# Patient Record
Sex: FEMALE | Race: WHITE | NOT HISPANIC OR LATINO | Employment: OTHER
[De-identification: names, ages, dates, MRNs, and addresses within clinical notes are randomized per-mention and may not be internally consistent; named-entity substitution may affect disease eponyms.]

---

## 2017-08-25 ENCOUNTER — HOSPITAL (OUTPATIENT)
Dept: OTHER | Age: 52
End: 2017-08-25

## 2019-10-17 ENCOUNTER — HOSPITAL (OUTPATIENT)
Dept: OTHER | Age: 54
End: 2019-10-17
Attending: OBSTETRICS & GYNECOLOGY

## 2019-12-07 ENCOUNTER — HOSPITAL (OUTPATIENT)
Dept: OTHER | Age: 54
End: 2019-12-07
Attending: INTERNAL MEDICINE

## 2019-12-11 ENCOUNTER — HOSPITAL (OUTPATIENT)
Dept: OTHER | Age: 54
End: 2019-12-11
Attending: INTERNAL MEDICINE

## 2019-12-25 ENCOUNTER — HOSPITAL ENCOUNTER (OUTPATIENT)
Age: 54
Discharge: HOME | End: 2019-12-25
Attending: EMERGENCY MEDICINE
Payer: COMMERCIAL

## 2019-12-25 VITALS
DIASTOLIC BLOOD PRESSURE: 79 MMHG | RESPIRATION RATE: 18 BRPM | WEIGHT: 132 LBS | TEMPERATURE: 98 F | OXYGEN SATURATION: 98 % | SYSTOLIC BLOOD PRESSURE: 144 MMHG | HEART RATE: 75 BPM | BODY MASS INDEX: 18.9 KG/M2 | HEIGHT: 70 IN

## 2019-12-25 DIAGNOSIS — J01.00 ACUTE NON-RECURRENT MAXILLARY SINUSITIS: Primary | ICD-10-CM

## 2019-12-25 PROCEDURE — 99204 OFFICE O/P NEW MOD 45 MIN: CPT

## 2019-12-25 PROCEDURE — 99203 OFFICE O/P NEW LOW 30 MIN: CPT

## 2019-12-25 RX ORDER — AMOXICILLIN 875 MG/1
875 TABLET, COATED ORAL 2 TIMES DAILY
Qty: 20 TABLET | Refills: 0 | Status: SHIPPED | OUTPATIENT
Start: 2019-12-25 | End: 2020-01-04

## 2019-12-25 RX ORDER — FLUCONAZOLE 150 MG/1
150 TABLET ORAL ONCE
Qty: 1 TABLET | Refills: 0 | Status: SHIPPED | OUTPATIENT
Start: 2019-12-25 | End: 2019-12-25

## 2019-12-25 NOTE — ED PROVIDER NOTES
Patient Seen in: [facility] In [address] [place] [address]      History   Patient presents with:  Cough/URI    Stated Complaint: sinus infeciton    HPI  Patient is here with 2 weeks of pain over the maxillary sinuses, pressure in the upper teeth, nasa tenderness present. No frontal sinus tenderness. Left Sinus: Maxillary sinus tenderness present. No frontal sinus tenderness.       Mouth/Throat:      Mouth: Mucous membranes are moist.      Pharynx: No oropharyngeal exudate or posterior oropharyngeal

## 2020-06-26 ENCOUNTER — HOSPITAL ENCOUNTER (OUTPATIENT)
Dept: ULTRASOUND IMAGING | Age: 55
Discharge: HOME | End: 2020-06-26
Attending: INTERNAL MEDICINE

## 2020-06-26 DIAGNOSIS — R92.8 ABNORMAL MAMMOGRAM OF LEFT BREAST: ICD-10-CM

## 2020-06-26 PROCEDURE — 76642 ULTRASOUND BREAST LIMITED: CPT

## 2020-10-02 ENCOUNTER — HOSPITAL ENCOUNTER (OUTPATIENT)
Dept: ULTRASOUND IMAGING | Age: 55
Discharge: HOME | End: 2020-10-02
Attending: INTERNAL MEDICINE

## 2020-10-02 ENCOUNTER — HOSPITAL ENCOUNTER (OUTPATIENT)
Dept: MAMMOGRAPHY | Age: 55
Discharge: HOME | End: 2020-10-02
Attending: INTERNAL MEDICINE

## 2020-10-02 DIAGNOSIS — R92.8 ABNORMAL MAMMOGRAM OF LEFT BREAST: ICD-10-CM

## 2020-10-02 DIAGNOSIS — Z09 FOLLOW UP: ICD-10-CM

## 2020-10-02 PROCEDURE — 76642 ULTRASOUND BREAST LIMITED: CPT

## 2020-10-02 PROCEDURE — G0279 TOMOSYNTHESIS, MAMMO: HCPCS

## 2021-10-08 ENCOUNTER — HOSPITAL ENCOUNTER (OUTPATIENT)
Dept: ULTRASOUND IMAGING | Age: 56
Discharge: HOME | End: 2021-10-08
Attending: INTERNAL MEDICINE

## 2021-10-08 ENCOUNTER — HOSPITAL ENCOUNTER (OUTPATIENT)
Dept: MAMMOGRAPHY | Age: 56
Discharge: HOME | End: 2021-10-08
Attending: INTERNAL MEDICINE

## 2021-10-08 DIAGNOSIS — R92.2 DENSE BREASTS: ICD-10-CM

## 2021-10-08 DIAGNOSIS — R92.30 DENSE BREASTS: ICD-10-CM

## 2021-10-08 DIAGNOSIS — Z12.9 SCREENING FOR MALIGNANT NEOPLASM: ICD-10-CM

## 2021-10-08 PROCEDURE — 76641 ULTRASOUND BREAST COMPLETE: CPT

## 2021-10-08 PROCEDURE — 77063 BREAST TOMOSYNTHESIS BI: CPT

## 2022-05-28 ENCOUNTER — HOSPITAL ENCOUNTER (OUTPATIENT)
Age: 57
Discharge: HOME | End: 2022-05-28
Payer: COMMERCIAL

## 2022-05-28 VITALS
HEIGHT: 70 IN | HEART RATE: 86 BPM | OXYGEN SATURATION: 99 % | TEMPERATURE: 99 F | RESPIRATION RATE: 18 BRPM | SYSTOLIC BLOOD PRESSURE: 137 MMHG | BODY MASS INDEX: 19.33 KG/M2 | DIASTOLIC BLOOD PRESSURE: 94 MMHG | WEIGHT: 135 LBS

## 2022-05-28 DIAGNOSIS — Z20.822 ENCOUNTER FOR LABORATORY TESTING FOR COVID-19 VIRUS: ICD-10-CM

## 2022-05-28 DIAGNOSIS — J01.90 ACUTE NON-RECURRENT SINUSITIS, UNSPECIFIED LOCATION: Primary | ICD-10-CM

## 2022-05-28 LAB — SARS-COV-2 RNA RESP QL NAA+PROBE: NOT DETECTED

## 2022-05-28 RX ORDER — AMOXICILLIN AND CLAVULANATE POTASSIUM 875; 125 MG/1; MG/1
1 TABLET, FILM COATED ORAL 2 TIMES DAILY
Qty: 14 TABLET | Refills: 0 | Status: SHIPPED | OUTPATIENT
Start: 2022-05-28 | End: 2022-06-04

## 2022-05-28 RX ORDER — VALACYCLOVIR HYDROCHLORIDE 1 G/1
TABLET, FILM COATED ORAL
COMMUNITY

## 2022-05-28 RX ORDER — MICONAZOLE NITRATE 200 MG/1
200 SUPPOSITORY VAGINAL NIGHTLY
Qty: 6 SUPPOSITORY | Refills: 0 | Status: SHIPPED | OUTPATIENT
Start: 2022-05-28 | End: 2022-06-03

## 2022-05-28 RX ORDER — FLUCONAZOLE 150 MG/1
150 TABLET ORAL
Qty: 2 TABLET | Refills: 0 | Status: SHIPPED | OUTPATIENT
Start: 2022-05-28 | End: 2022-06-01

## 2022-05-28 NOTE — ED INITIAL ASSESSMENT (HPI)
Pt presents to the IC with c/o sinus congestion and pressure for the last 2 weeks. Pt hasn't been tested for covid during that time. No cough, fevers, ear pain or sore throat.

## 2022-10-14 ENCOUNTER — HOSPITAL ENCOUNTER (OUTPATIENT)
Dept: ULTRASOUND IMAGING | Age: 57
Discharge: HOME | End: 2022-10-14
Attending: INTERNAL MEDICINE

## 2022-10-14 ENCOUNTER — HOSPITAL ENCOUNTER (OUTPATIENT)
Dept: MAMMOGRAPHY | Age: 57
Discharge: HOME | End: 2022-10-14
Attending: INTERNAL MEDICINE

## 2022-10-14 DIAGNOSIS — R92.2 DENSE BREAST: ICD-10-CM

## 2022-10-14 DIAGNOSIS — Z12.39 SCREENING FOR MALIGNANT NEOPLASM OF BREAST: ICD-10-CM

## 2022-10-14 DIAGNOSIS — R92.30 DENSE BREAST: ICD-10-CM

## 2022-10-14 PROCEDURE — 77067 SCR MAMMO BI INCL CAD: CPT

## 2022-10-14 PROCEDURE — 76641 ULTRASOUND BREAST COMPLETE: CPT

## 2023-08-28 DIAGNOSIS — Z12.31 ENCOUNTER FOR SCREENING MAMMOGRAM FOR MALIGNANT NEOPLASM OF BREAST: ICD-10-CM

## 2023-08-28 DIAGNOSIS — R92.2 DENSE BREASTS: Primary | ICD-10-CM

## 2023-08-28 DIAGNOSIS — R92.30 DENSE BREASTS: Primary | ICD-10-CM

## 2023-10-19 ENCOUNTER — HOSPITAL ENCOUNTER (OUTPATIENT)
Dept: CT IMAGING | Age: 58
Discharge: HOME | End: 2023-10-19
Attending: INTERNAL MEDICINE

## 2023-10-19 DIAGNOSIS — R92.30 DENSE BREASTS: ICD-10-CM

## 2023-10-19 DIAGNOSIS — R92.2 DENSE BREASTS: ICD-10-CM

## 2023-10-19 DIAGNOSIS — Z12.31 ENCOUNTER FOR SCREENING MAMMOGRAM FOR MALIGNANT NEOPLASM OF BREAST: ICD-10-CM

## 2023-10-19 PROCEDURE — 77063 BREAST TOMOSYNTHESIS BI: CPT

## 2023-10-19 PROCEDURE — 76641 ULTRASOUND BREAST COMPLETE: CPT

## 2024-02-24 ENCOUNTER — HOSPITAL ENCOUNTER (OUTPATIENT)
Age: 59
Discharge: HOME | End: 2024-02-24
Payer: COMMERCIAL

## 2024-02-24 VITALS
HEART RATE: 86 BPM | OXYGEN SATURATION: 100 % | SYSTOLIC BLOOD PRESSURE: 129 MMHG | DIASTOLIC BLOOD PRESSURE: 74 MMHG | TEMPERATURE: 98 F | RESPIRATION RATE: 18 BRPM

## 2024-02-24 DIAGNOSIS — R52 BODY ACHES: Primary | ICD-10-CM

## 2024-02-24 DIAGNOSIS — J01.11 ACUTE RECURRENT FRONTAL SINUSITIS: ICD-10-CM

## 2024-02-24 LAB — SARS-COV-2 RNA RESP QL NAA+PROBE: NOT DETECTED

## 2024-02-24 PROCEDURE — 99213 OFFICE O/P EST LOW 20 MIN: CPT | Performed by: NURSE PRACTITIONER

## 2024-02-24 PROCEDURE — U0002 COVID-19 LAB TEST NON-CDC: HCPCS | Performed by: NURSE PRACTITIONER

## 2024-02-24 RX ORDER — FLUCONAZOLE 150 MG/1
150 TABLET ORAL ONCE
Qty: 1 TABLET | Refills: 0 | Status: SHIPPED | OUTPATIENT
Start: 2024-02-24 | End: 2024-02-24

## 2024-02-24 RX ORDER — AMOXICILLIN AND CLAVULANATE POTASSIUM 875; 125 MG/1; MG/1
1 TABLET, FILM COATED ORAL 2 TIMES DAILY
Qty: 14 TABLET | Refills: 0 | Status: SHIPPED | OUTPATIENT
Start: 2024-02-24 | End: 2024-03-02

## 2024-02-24 NOTE — DISCHARGE INSTRUCTIONS
COVID test is negative.  Start Flonase.  Take Augmentin twice a day until gone.  Saline nasal spray.  Warm steam to your face.  Tylenol or Motrin as needed for pain.  Follow-up with your primary doctor if no improvement    [email or web address] --> find a doctor

## 2024-02-24 NOTE — ED INITIAL ASSESSMENT (HPI)
Pt reports nasal congestion, sinus pressure, headaches x 7-8 days. Denies fevers. Concerned for sinus infection.  Pt declines covid/flu testing.

## 2024-02-24 NOTE — ED PROVIDER NOTES
Patient Seen in: [facility]      History     Chief Complaint   Patient presents with    Nasal Congestion     Stated Complaint: Congestion, body aches    Subjective:   58-year-old female with no past medical history presents from home with concern for recurrent sinus infection.  Onset of symptoms about 7 days ago.  Symptoms mostly on the left side.  Complaining of left side facial pressure and headache.  Body aches, congestion, ringing in her ears.  States she takes antibiotics about once a year for sinus symptoms.  No fever at home.  She has been using a sinus rinse.  No COVID testing done at home.  States her  was recently seen here and diagnosed with pneumonia.  She is not taking any antihistamines at home.  Non-smoker.    The history is provided by the patient. No  was used.           Objective:   History reviewed. No pertinent past medical history.           History reviewed. No pertinent surgical history.             Social History     Socioeconomic History    Marital status:    Tobacco Use    Smoking status: Never    Smokeless tobacco: Never   Vaping Use    Vaping Use: Never used              Review of Systems    Positive for stated complaint: Congestion, body aches  Other systems are as noted in HPI.  Constitutional and vital signs reviewed.      All other systems reviewed and negative except as noted above.    Physical Exam     ED Triage Vitals [02/24/24 1458]   /74   Pulse 86   Resp 18   Temp 98 °F (36.7 °C)   Temp src Temporal   SpO2 100 %   O2 Device None (Room air)       Current:/74   Pulse 86   Temp 98 °F (36.7 °C) (Temporal)   Resp 18   SpO2 100%         Physical Exam  Vitals and nursing note reviewed.   Constitutional:       General: She is not in acute distress.     Appearance: Normal appearance. She is not ill-appearing or toxic-appearing.   HENT:      Head: Normocephalic and atraumatic.      Right Ear: Tympanic membrane, ear canal and  external ear normal.      Left Ear: Tympanic membrane, ear canal and external ear normal.      Nose: Mucosal edema and congestion present.      Right Turbinates: Not enlarged, swollen or pale.      Left Turbinates: Not enlarged, swollen or pale.      Comments: Mild left forehead tenderness. No facial swelling     Mouth/Throat:      Mouth: Mucous membranes are moist.      Pharynx: Oropharynx is clear. No pharyngeal swelling or posterior oropharyngeal erythema.      Tonsils: No tonsillar exudate.   Eyes:      Pupils: Pupils are equal, round, and reactive to light.   Cardiovascular:      Rate and Rhythm: Normal rate and regular rhythm.      Pulses: Normal pulses.   Pulmonary:      Effort: Pulmonary effort is normal. No respiratory distress.      Breath sounds: Normal breath sounds.      Comments: Lungs clear.  No adventitious lung sounds.  No distress.  No hypoxia.  Pulse ox 100% ra. Which is normal    Abdominal:      General: Abdomen is flat.      Palpations: Abdomen is soft.   Musculoskeletal:         General: No signs of injury. Normal range of motion.      Cervical back: Normal range of motion and neck supple.   Lymphadenopathy:      Cervical: No cervical adenopathy.   Skin:     General: Skin is warm and dry.      Capillary Refill: Capillary refill takes less than 2 seconds.   Neurological:      General: No focal deficit present.      Mental Status: She is alert and oriented to person, place, and time.      GCS: GCS eye subscore is 4. GCS verbal subscore is 5. GCS motor subscore is 6.   Psychiatric:         Mood and Affect: Mood normal.         Behavior: Behavior normal.         Thought Content: Thought content normal.         Judgment: Judgment normal.         ED Course     Labs Reviewed   RAPID SARS-COV-2 BY PCR - Normal   POCT FLU TEST     Recent Results (from the past 24 hour(s))   Rapid SARS-CoV-2 by PCR    Collection Time: 02/24/24  2:59 PM    Specimen: Nares; Other   Result Value Ref Range    Rapid  SARS-CoV-2 by PCR Not Detected Not Detected     “PODS” clinical criteria   Suggest ABRS with two or more of  Facial pain/pressure/fullness  Nasal obstruction  Nasal purulence/discoloured postnasal discharge  Decreased/absent smell   that persist for more than 7-10 days       MDM        Medical Decision Making  Acute sinusitis  Differential diagnosis: Viral versus bacterial sinusitis, COVID  COVID test is negative  Patient having facial pain for 7 days no other specific pods criteria  States recurrent sinusitis and would like to start antibiotics  Nontoxic-appearing on exam  Plan of care: Augmentin, Flonase, saline nasal spray    Results and plan of care discussed with the patient/family. They are in agreement with discharge. They understand to follow up with their primary doctor or the referral physician for further evaluation, especially if no improvement.  Also discussed the limitations of immediate care, patient is aware that if symptoms are worse they should go to the emergency room. Verbal and written discharge instructions were given.       Problems Addressed:  Acute recurrent frontal sinusitis: acute illness or injury  Body aches: acute illness or injury    Amount and/or Complexity of Data Reviewed  External Data Reviewed: notes.     Details: Telehealth visit 6/22 - given cefdinir, Medrol, Flonase for sinus infection  Labs: ordered. Decision-making details documented in ED Course.    Risk  OTC drugs.  Prescription drug management.        Disposition and Plan     Clinical Impression:  1. Body aches    2. Acute recurrent frontal sinusitis         Disposition:  Discharge  2/24/2024  3:39 pm    Follow-up:  John [facility]  [address]  [address] [place] [id number] [phone]                Medications Prescribed:  Discharge Medication List as of 2/24/2024  3:39 PM        START taking these medications    Details   amoxicillin clavulanate 875-125 MG Oral Tab Take 1 tablet by mouth 2 (two) times daily  for 7 days., Print, Disp-14 tablet, R-0

## 2024-06-25 DIAGNOSIS — R92.8 OTH ABN AND INCONCLUSIVE FINDINGS ON DX IMAGING OF BREAST: ICD-10-CM

## 2024-06-25 DIAGNOSIS — R93.89 ABNORMAL FINDING OF DIAGNOSTIC IMAGING: Primary | ICD-10-CM

## 2024-10-11 NOTE — PROGRESS NOTES
Subjective:   Giuliana Klein is a 59 year old female who presents for Other (Establish care )     Sinusitis  Get intermittent sinus infections. Usually gets once yearly in Jan-Feb. This year however had already had 3-4 this year. Has been able to prevent 2 of them with nasal rinses. Has been noting more left sided sinus pressure about 2 days ago.     HSV type 1  Patient gets intermittently takes valacyclovir as needed    HRT  Does follow with Dr Melendez. Gets compounded estrogen and testosterone. Also takes progesterone capsules. Doing well.     Declines flu shot today    History/Other:    Chief Complaint Reviewed and Verified  Nursing Notes Reviewed and   Verified  Tobacco Reviewed  Allergies Reviewed  Medications Reviewed    Problem List Reviewed  Medical History Reviewed  Surgical History   Reviewed  OB Status Reviewed  Family History Reviewed  Social History   Reviewed         Tobacco:  She has never smoked tobacco.    Current Outpatient Medications   Medication Sig Dispense Refill    Pediatric Multivitamins-Iron (CHILDRENS MULTIVITAMIN/IRON) 15 MG Oral Chew Tab       fluticasone propionate 50 MCG/ACT Nasal Suspension PLACE 1 SPRAY IN THE NOSE TWO TIMES PER DAY. IN EACH NOSTRIL      progesterone 100 MG Oral Cap Take 1 capsule (100 mg total) by mouth nightly. TAKE AT BEDTIME      Testosterone 25 MG/2.5GM (1%) Transdermal Gel Place onto the skin.      Omega 3 340 MG Oral Capsule Delayed Release       valACYclovir 1 G Oral Tab Take 1 tablet (1,000 mg total) by mouth daily for 5 days. 5 tablet 3    Compound Medication E2/E3 4 clicks (1 ml)      Vitamin D-Vitamin K  MCG/0.1ML Oral Liquid Take by mouth.           Review of Systems:  Review of Systems   Constitutional: Negative.    HENT:  Positive for sinus pain (left sided).    Respiratory: Negative.     Cardiovascular: Negative.    Gastrointestinal: Negative.    Skin: Negative.    Neurological: Negative.          Objective:   /80    Pulse 78   Ht 5' 9\" (1.753 m)   Wt 137 lb (62.1 kg)   SpO2 98%   BMI 20.23 kg/m²  Estimated body mass index is 20.23 kg/m² as calculated from the following:    Height as of this encounter: 5' 9\" (1.753 m).    Weight as of this encounter: 137 lb (62.1 kg).    BP Readings from Last 5 Encounters:   10/11/24 144/80   02/24/24 129/74   05/28/22 (!) 137/94   12/25/19 144/79       Physical Exam  Vitals and nursing note reviewed.   Constitutional:       General: She is not in acute distress.     Appearance: Normal appearance.   HENT:      Head: Normocephalic and atraumatic.   Eyes:      General:         Right eye: No discharge.         Left eye: No discharge.      Comments: Extraocular eye movements grossly intact   Cardiovascular:      Rate and Rhythm: Normal rate and regular rhythm.      Pulses: Normal pulses.      Heart sounds: Normal heart sounds. No murmur heard.     No friction rub. No gallop.   Pulmonary:      Effort: Pulmonary effort is normal. No respiratory distress.      Breath sounds: Normal breath sounds. No stridor. No wheezing, rhonchi or rales.   Musculoskeletal:      Comments: Normal gait   Skin:     Findings: No rash.   Neurological:      General: No focal deficit present.      Mental Status: She is alert. Mental status is at baseline.   Psychiatric:         Mood and Affect: Mood normal.         Behavior: Behavior normal.           Assessment & Plan:   1. Herpes simplex type 1 infection (Primary)  Orders:  -     valACYclovir HCl; Take 1 tablet (1,000 mg total) by mouth daily for 5 days.  Dispense: 5 tablet; Refill: 3    -stable doing well. Refilled valacyclovir so can have more on hand for any future episodes    2. Acute recurrent maxillary sinusitis  -counseled patient to continue sinus rinses and Flonase. Can also add on Claritin. If no improvement by day 7 may message provider and will send in antibiotics    3. Elevated blood pressure reading  Remained borderline elevated despite repeat check.  Counseled patient to send some readings over the mychart.     4. Vasomotor symptoms due to menopause  Stable and doing well on HRT. To continue to follow up with Dr Melendez    Return in about 1 year (around 10/11/2025), or if symptoms worsen or fail to improve.    Evangelina Lentz MD, 10/11/2024, 1:09 PM

## 2025-02-21 NOTE — TELEPHONE ENCOUNTER
Noted.  This is technically not an indication to get a diagnostic mammogram but I did place that order for the patient.  The system would not allow me to attach a screening diagnosis code to the mammogram order so I did use the diagnosis codes that patient is on HRT.  Please inform patient that if there are any issues with insurance coverage the patient is responsible for all out-of-pocket costs    Dr. Mandel

## 2025-02-21 NOTE — TELEPHONE ENCOUNTER
Incoming call from patient requesting a mammogram with ultrasound order. Patient stated she would like to get it completed prior to her visit with Dr. Lentz in September.     Please assist with order.

## 2025-02-21 NOTE — TELEPHONE ENCOUNTER
Signed off on order thank you for pending. Did review outside mammograms and last mammo in 8/2024 was diagnostic but returned normal. Unsure why patient requesting diagnostic. Should return to screening mammograms unless there is a concern    Evangelina Lentz MD, 02/21/25, 12:05 PM

## 2025-02-21 NOTE — TELEPHONE ENCOUNTER
[No signed verbal release on file]    Left message to call back; [employer] message sent [1st attempt]    RN Triage - please check if patient read [employer] message, if not please make 2nd attempt to call

## 2025-02-21 NOTE — TELEPHONE ENCOUNTER
Dr Lentz: see [employer]hart request.  Works in healthcare. Do you recommend testing for titers?

## 2025-02-21 NOTE — TELEPHONE ENCOUNTER
Dr Lentz:   Patient called back. Patient states she asking for diagnostic mammogram because she is on hormonal replacement therapy, and patient has concern she has risk of developing breast cancer.   and because patient has dense tissue.    (Dr Melendez prescribed E2/E3 compound hormone therapy)    (Also there is a separate [employer] message for patient regarding request for mmr titers.)

## 2025-02-21 NOTE — TELEPHONE ENCOUNTER
Called the patient to inform of order signed - no answer. Left a message to call the office back.

## 2025-02-23 NOTE — ED PROVIDER NOTES
Patient Seen in: [facility]    History     Chief Complaint   Patient presents with    Rash Skin Problem     Stated Complaint: Shingles    HPI    Giuliana Klein is a 59 year old female who presents with chief complaint of rash.   Onset yesterday.  Rash is located at posterior neck.  Patient complains of associated pruritis.  Patient denies exposure to new medication, food, soap or plant.  Patient denies fever, chills, abdominal pain, nausea, vomiting, diarrhea, constipation, sore throat, cough, dyspnea, wheeze, mouth/throat swelling, neck pain, restricted neck movement, neck swelling.      No past medical history on file.    Past Surgical History:   Procedure Laterality Date    Excise hand/foot neuroma Left     between 3rd and 4th metatarsal    Hc  section level i  1996    Inguinal hernia repair Right     Umbilical hernia repair              Family History   Problem Relation Age of Onset    Juvenile Rheumatoid Arthritis Mother     Other (chronic bronchitis) Mother     Other (HLD) Father     Other (HTN) Father     Other (Myasthenia gravis) Maternal Aunt     Breast Cancer Maternal Aunt     Breast Cancer Maternal Cousin        Social History     Socioeconomic History    Marital status:    Occupational History    Occupation: Pediatric OT, independent   Tobacco Use    Smoking status: Never    Smokeless tobacco: Never   Vaping Use    Vaping status: Never Used   Substance and Sexual Activity    Alcohol use: Yes    Drug use: Never       Review of Systems    Positive for stated complaint: Shingles  Other systems are as noted in HPI.  Constitutional and vital signs reviewed.      All other systems reviewed and negative except as noted above.    PSFH elements reviewed from today and agreed except as otherwise stated in HPI.    Physical Exam     ED Triage Vitals [25 0806]   /88   Pulse 76   Resp 16   Temp 97 °F (36.1 °C)   Temp src Oral   SpO2 99 %   O2 Device None (Room air)        Current:/88   Pulse 76   Temp 97 °F (36.1 °C) (Oral)   Resp 16   SpO2 99%     PULSE OX within normal limits on room air as interpreted by this provider.    Constitutional: The patient is cooperative. Appears well-developed and well-nourished.  No acute distress.  Psychological: Alert, No abnormalities of mood, affect.  Head: Normocephalic/atraumatic.  Eyes: Pupils are equal round reactive to light.  Conjunctiva are within normal limits.  ENT: Oropharynx is clear.  No angioedema.  Neck: The neck is supple.  Nontender.  No meningeal signs.  Chest: There is no tenderness to the chest wall.  Respiratory: Respiratory effort was normal.  There is no rales, wheezes, or rhonchi.  There is no stridor.  Air entry is equal.  Cardiovascular: Regular rate and rhythm.  Capillary refill is brisk.    Genitourinary: Not examined.  Lymphatic: No gross lymphadenopathy noted.  Musculoskeletal: Musculoskeletal system is grossly intact.  There is no obvious deformity.  Neurological: Gross motor movement is intact in all 4 extremities.  Patient exhibits normal speech.  Skin: Raised, erythematous, pruritic rash that does not cross the midline is present at the left posterior neck.  No open wound, purulent drainage, erythematous tracking, induration or fluctuance.  No obvious bruising.           ED Course   Labs Reviewed - No data to display    MDM     Differential diagnosis including but not limited to allergic reaction, contact dermatitis, nonspecific rash, herpes zoster    Physical exam remained stable as previously documented.  Physical exam findings discussed with patient.  Patient agreeable to treatment for suspected herpes zoster.    I have given the patient instructions regarding their diagnoses, expectations, follow up, and ER precautions. I explained to the patient that emergent conditions may arise and to go to the ER for new, worsening or any persistent conditions. I've explained the importance of following up  with their doctor as instructed. The patient verbalized understanding of the discharge instructions and plan.            Disposition and Plan     Clinical Impression:  1. Herpes zoster without complication        Disposition:  Discharge    Follow-up:  Evangelina Lentz MD  [address] [address] [phone]    Call in 1 day  For follow-up      Medications Prescribed:  Current Discharge Medication List        START taking these medications    Details   valACYclovir 1 G Oral Tab Take 1 tablet (1,000 mg total) by mouth 3 (three) times daily for 7 days.  Qty: 21 tablet, Refills: 0

## 2025-02-23 NOTE — ED INITIAL ASSESSMENT (HPI)
Pt is concerned for shingles she has a raised itchy spot on the back of her neck . 2 weeks ago she had a raised itchy area on her back that has since resolved. Pt reports she has gotten the shingles vaccine

## 2025-02-24 NOTE — TELEPHONE ENCOUNTER
Spoke to patient (verified Name and ) and relayed provider's message below. Patient verbalized understanding. 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    Dr. Lentz please see pended letter for review and approval.

## 2025-02-24 NOTE — TELEPHONE ENCOUNTER
Given occupation, would recommend patient be off work for 5 days (usually time it takes for lesions to crust over). Once lesions crust over no longer infectious. Please pend letter if patient needs    Evangelina Lentz MD, 02/24/25, 12:14 PM

## 2025-02-24 NOTE — TELEPHONE ENCOUNTER
Verified name and .    Patient was seen in the urgent care on 25 for shingles outbreak.    She states that her rash in minimal- few raised blisters on her neck- denies any fevers.    She states that she was prescribed Valacyclovir and advised by the PA yesterday that she needs to stay 6 feet away from infants and pregnant women. 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    She is asking for clarification from Dr. Lentz about when she can return to work and what precautions she needs to take with current shingles outbreak.    She states that if appointment is required, she asks that Dr. Lentz add her to the schedule as there are no openings with Dr. Lentz this week.    Please advise and thank you.    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 I went to urgent care yesterday and my shingles outbreak is minimal and based on google not the typical rash that one will crust over. I have no fever. I did have the shingles vaccine; second shingles vaccination was 2023. I was given a 7 day course of Valacyclovir. The PA yesterday when asked stated I can treat infants and if a mom is pregnant they should be six feet away. I am seeking clarification in regard to the precautions I should take and when I can conclude the precautions. Thank you for your help.  Hoping you have a great day!  Giuliana

## 2025-02-24 NOTE — TELEPHONE ENCOUNTER
Thank you for pending letter. Sent via [employer]    Evangelina Lentz MD, 02/24/25, 2:27 PM

## 2025-05-01 NOTE — TELEPHONE ENCOUNTER
Attempted to call the patient to ask more about her symptoms, no answer. Left a voicemail to call the office back. Sent a my chart symptoms note.

## 2025-05-02 NOTE — TELEPHONE ENCOUNTER
Action Requested: Summary for Provider     []  Critical Lab, Recommendations Needed  [] Need Additional Advice  []   FYI    []   Need Orders  [] Need Medications Sent to Pharmacy  []  Other     SUMMARY: Patient with sinus symptoms    Patient reports: since April 21 has  left sinus pressure, runny nose, pressure at forehead, ears feel plugged.     Denies: cough, sore throat, fever, wheezing.    Patient states last office visit Dr Lentz advised if she had symptoms like the ones reported just to call and she would give an antibiotic.    Patient has been taking OTC remedies such as sudafed without relief.     Please advise.  If patient needs office visit prefers to see Dr Lentz and no openings.    Reason for call: No chief complaint on file.  Onset: Data Unavailable                     Additional Information  • Commented on: Earache     pressure    Protocols used: Sinus Pain and Congestion-A-[place]

## (undated) NOTE — LETTER
2/24/2025      To Whom It May Concern:    Giuliana Klein is currently under my medical care and may not return to work at this time as she is diagnosed with Shingles. It is recommended for her to be off work for at least 5 days or until she is no longer infectious. No activity restrictions.    If you require additional information please contact our office.      Sincerely,    Evangelina Lentz MD  [address] [place] [id number]  Ph: [phone]  Fax: [phone]                   Document generated by:  Gail FREY RN